# Patient Record
Sex: MALE | Race: BLACK OR AFRICAN AMERICAN | Employment: UNEMPLOYED | URBAN - METROPOLITAN AREA
[De-identification: names, ages, dates, MRNs, and addresses within clinical notes are randomized per-mention and may not be internally consistent; named-entity substitution may affect disease eponyms.]

---

## 2017-02-07 ENCOUNTER — HOSPITAL ENCOUNTER (OUTPATIENT)
Dept: WOUND CARE | Age: 77
Discharge: HOME | End: 2017-02-07
Payer: COMMERCIAL

## 2017-02-07 PROCEDURE — 99212 OFFICE O/P EST SF 10 MIN: CPT

## 2017-02-07 PROCEDURE — 11042 DBRDMT SUBQ TIS 1ST 20SQCM/<: CPT

## 2017-02-07 PROCEDURE — 11045 DBRDMT SUBQ TISS EACH ADDL: CPT

## 2017-02-07 RX ORDER — THERA TABS 400 MCG
1 TAB ORAL DAILY
COMMUNITY

## 2017-02-07 RX ORDER — LIDOCAINE HYDROCHLORIDE 20 MG/ML
JELLY TOPICAL
Status: COMPLETED | OUTPATIENT
Start: 2017-02-07 | End: 2017-02-07

## 2017-02-07 RX ORDER — DEXTROMETHORPHAN HYDROBROMIDE, GUAIFENESIN 5; 100 MG/5ML; MG/5ML
650 LIQUID ORAL
COMMUNITY
End: 2018-01-29

## 2017-02-07 RX ADMIN — LIDOCAINE HYDROCHLORIDE 4 ML: 20 JELLY TOPICAL at 11:00

## 2017-02-07 NOTE — PROGRESS NOTES
[address] [place] [facility] 12 [address] [facility]   WOUND CARE PROGRESS NOTE       Name:  Jacquelin Hare   MR#:  [id number]   :  1940   Account #:  [de-identified]        Date of Adm:  2017       CHIEF COMPLAINT: The patient has wounds to both lower legs. HISTORY OF PRESENT ILLNESS: The patient is being evaluated at   the wound care clinic for wounds to both lower extremities they   have had over the past few months. The history is obtained from the   records and the aide that accompanied him today. The patient has   dementia; therefore, he is not a reliable source. He appears to have no   pain associated with the wound. The nursing home has been doing   regular dressing changes with the application of a nonadherent   followed by a paste-impregnated wrap. He spends most of the day with   his legs in the dependent position. The ambulates at times, but only   briefly. Other than his wound, he has no history of having wounds of   his lower legs that were difficult to heal. He has no history of   claudication or circulation issues. 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 PAST SURGICAL HISTORY: Appendectomy, urinary stents. ALLERGIES: HE IS ALLERGIC TO PENICILLIN. CURRENT MEDICATIONS   1. Amantadine 100 mg.   2. Dulcolax 5 mg.   3. Sinemet 25/100.   4. Vitamin D3.   5. Pepcid. 6. Florinef 0.1 mg tablets. 7. Ritalin 5 mg. .   9. Potassium. 10. Seroquel 25 mg. SOCIAL HISTORY: He lives at the skilled nursing facility. He is not   , does not drink or smoke. FAMILY HISTORY: Could not be obtained and is not in his current   records. REVIEW OF SYSTEMS: He appears to have no headache. He has no   abdominal pain, no chest pain or shortness of breath.     PHYSICAL EXAMINATION   GENERAL: He is lying comfortably in the bed.   VITAL SIGNS: His temperature is 98.89. His respirations 16, pulse 76,   blood pressure 108/66. HEENT: His pupils are equal, round, and extraocular movements are   intact. Sclerae are white. NECK: Supple, full range of motion, no lymphadenopathy or   thyromegaly. LUNGS: Clear without rales or rhonchi. CARDIOVASCULAR: Regular rate and rhythm without murmur. ABDOMEN: Soft, flat, nontender. EXTREMITIES: Lower extremity has minimal swelling. He has   palpable dorsalis pedis pulse. He has ERIC on the left at 0.74, on the   right 1.19. He has brisk capillary refill. He has multiple wounds on both   lower legs. The right are measured as a cluster measures 25 cm in   length, 20 cm in width, and 0.1 cm in depth. There is some slough and   necrotic subcutaneous tissue. There is clear serous drainage. There is   some scaling skin. There is no odor. There is no tenderness. The left   lower leg again is measured as a cluster 23 cm length, 24.5 cm in   width, and 0.1 cm depth. There is some slough. There is some necrotic   tissue. There is a clear serous discharge. There is no surrounding   erythema. There is no odor. There is no tenderness. ASSESSMENT: Full-thickness wounds on both lower extremities   secondary to venous insufficiency. Treatment today consisted of   debridement of the subcutaneous tissue on both lower extremities. Consent was obtained through the brother. A time-out was taken. The   risks were reviewed. Lidocaine 2% topical gel was used as anesthetic   agent. A 5-0 curette was used to remove the excess necrotic tissue   and slough from both lower extremities. The patient tolerated the   procedure well. There was minimal bleeding, which was controlled with   pressure from gauze. Both nonadherent and adherent, both viable and   nonviable tissue was removed and discarded.  The post-debridement   measurement of the right lower leg was 25 cm length, 20 cm in width,   and 0.2 cm in depth and the left lower leg was 23 cm length, 24.5 cm   in width, and 0.2 cm in depth, not the entire area was debrided since it   measures as a cluster. The wounds were then cleaned with saline   and Aquacel AG was applied, followed by a 3-layer compression wrap. PLAN: The patient was recommended to have a dressing change with   a compression wrap mid week. he will then followup in 1 week. I   recommended that he spend his time with his legs elevated equal to   his heart. If he has any issues, he will  call or come back sooner. Otherwise, he will be reevaluated in 1 week.          MD JOEL Chery / Rae Farmer   D:  02/07/2017   10:30   T:  02/07/2017   11:57   Job #:  [id number]

## 2017-02-14 ENCOUNTER — HOSPITAL ENCOUNTER (OUTPATIENT)
Dept: WOUND CARE | Age: 77
Discharge: HOME | End: 2017-02-14
Payer: COMMERCIAL

## 2017-02-14 PROCEDURE — 29581 APPL MULTLAYER CMPRN SYS LEG: CPT

## 2017-02-14 RX ORDER — LIDOCAINE HYDROCHLORIDE 20 MG/ML
JELLY TOPICAL
Status: COMPLETED | OUTPATIENT
Start: 2017-02-14 | End: 2017-02-14

## 2017-02-14 RX ADMIN — LIDOCAINE HYDROCHLORIDE 4 ML: 20 JELLY TOPICAL at 09:48

## 2017-02-14 NOTE — PROGRESS NOTES
[address] [place] [facility] [address]   WOUND CARE PROGRESS NOTE       Name:  Merly Mendez   MR#:  [id number]   :  1940   Account #:  [de-identified]        Date of Adm:  2017       DATE OF SERVICE: 2017    CHIEF COMPLAINT: The patient has wounds in both lower legs. \"     HISTORY OF PRESENT ILLNESS: The patient is following up at the   wound care clinic for wounds on both lower extremities in the setting of   venous insufficiency. History is obtained from the records and the aide   that accompanied him today. The patient has been doing well, having   the compression wraps in place. He had a compression wrap changed   mid week. He appears to have no pain associated with the wounds. He   appears to have no nausea, vomiting, fevers or chills. He spends part   of the day with his legs in the dependent position and the other part   lying in his bed equal to his heart. PHYSICAL EXAMINATION   GENERAL: On exam, he is sitting comfortably in his wheelchair. VITAL SIGNS: His temperature is 98.0, respirations 16, pulse 71,   blood pressure is 115/70. EXTREMITIES: He has a wound in his right lower leg circumferentially   measured as a cluster 25.0 cm in length, 20.0 cm in width and 0.1 cm   in depth. There is no slough. There is pink, spongy granulation tissue. There is a clear serous discharge. There is no surrounding erythema. There is no odor. There is no tenderness. Wound #2 on the left lower   extremity measured as a cluster measures 23.0 cm in length, 24.5 cm   in width and 0.1 cm in depth. There is no slough. There is pink, spongy   granulation tissue. There is a clear serous discharge. There is no   surrounding erythema. There is no odor. There is no tenderness. ASSESSMENT: Full-thickness wound on both lower extremities in the   setting of venous insufficiency.  Treatment today consisted of cleaning   the wounds with saline and then applying a nonadherent followed by a   3-layer compression wrap. PLAN: The patient was encouraged to have his legs elevated equal to   his heart throughout the day. They are informed to try to avoid having   him sitting with his legs in the dependent position. I did not recommend   a compression wrap change mid week.          MD JOEL Oropeza / Fabby Mcintyre   D:  02/14/2017   10:08   T:  02/14/2017   10:42   Job #:  [id number]

## 2017-02-21 ENCOUNTER — APPOINTMENT (OUTPATIENT)
Dept: WOUND CARE | Age: 77
End: 2017-02-21
Payer: COMMERCIAL

## 2017-03-02 ENCOUNTER — HOSPITAL ENCOUNTER (OUTPATIENT)
Dept: WOUND CARE | Age: 77
Discharge: HOME | End: 2017-03-02
Payer: COMMERCIAL

## 2017-03-02 PROCEDURE — 29581 APPL MULTLAYER CMPRN SYS LEG: CPT

## 2017-03-02 RX ORDER — LIDOCAINE HYDROCHLORIDE 20 MG/ML
JELLY TOPICAL
Status: COMPLETED | OUTPATIENT
Start: 2017-03-02 | End: 2017-03-02

## 2017-03-02 RX ADMIN — LIDOCAINE HYDROCHLORIDE 2 ML: 20 JELLY TOPICAL at 14:38

## 2017-03-02 NOTE — PROGRESS NOTES
Wound Center  Progress Note    Subjective:   Corey Wrayite is a 65 y.o. male for follow up of circumferential ulcers of the bilateral legs  venous stasis ulcer. Pt is in long term care facility and has not been to the office for the past 2 weeks. He presented today in his 3 layer wraps, which were not removed last week and replaced with double tubigrips, as instructed. Offloading wound: somewhat, but not a conscious effort per the staff  Appetite: poor  Wound associated pain: none  Diabetic: no  Smoker: no  ROS: no N/V, no T/chills; no local rash  There have been no changes in patient's medical history in the interim. Objective:   T: 98.1 P: 66  RR: 16  BP: 128/84   General: well developed, well nourished, pleasant , NAD. Hygiene good  Psych: cooperative. Pleasant. Flat affect. Neuro: evidence of stroke, alert and oriented to person/place/situation. Otherwise nonfocal.  HEENT: Normocephalic, atraumatic. EOMI. Conjunctiva clear. No scleral icterus. Neck: Normal range of motion. No masses. Chest: Good chest expansion bilat  Lower extremities: evidence of ulcers circumferentially bilaterally- see below. Temperature normal. Hair growth is not present. Calves are supple, nontender, approximately equally sized in comparison. Capillary refill <3 sec  Focused Lower Extremity Exam:  Vascular exam:  Right lower extremity: mild  edema   DP pulse : 1+  PT pulse: 1+  Nails dystrophic     Left lower extremity: mild  edema   DP pulse : 1+  PT pulse: 1+   Nails dystrophic    Ulcer Description:   Etiology: venous stasis  Location: bilateral legs, circumferential  Measurement: R lower leg: 26 x 23 x 0.1 cm, L lower leg: 20 x 24 x 0.1 cm  Ulcer bed: Granular/Healthy    Periwound: Normal  Exudate: Scant/small amount Serosanguinous exudate    Assessment/Plan   65 y.o. male with circumferential bilateral venous stasis ulcers.  Did not come for his appt last week and 3-layer wraps were not removed and replaced with double tubigrips, as instructed. Ulcers have increased in size. Ulcers cleansed with saline. Dressing: Xeroform and three layer wraps  Frequency: weekly    Spent most of the 25 minute appointment discussed the importance of weekly appointments, increasing nutritional intake- specifically protein, and elevation of legs. Patient understood and agrees with plan. Questions answered. Weekly visits and serial debridements also discussed.   Follow up with in 1 week

## 2017-03-09 ENCOUNTER — HOSPITAL ENCOUNTER (OUTPATIENT)
Dept: WOUND CARE | Age: 77
Discharge: HOME | End: 2017-03-09
Payer: COMMERCIAL

## 2017-03-09 PROCEDURE — 99214 OFFICE O/P EST MOD 30 MIN: CPT

## 2017-03-10 NOTE — PROGRESS NOTES
[address] [place] [facility] [place] [address]   WOUND CARE PROGRESS NOTE       Name:  Maria Eugenia Khalil   MR#:  [id number]   :  1940   Account #:  [de-identified]        Date of Adm:  2017       DATE OF SERVICE: 2017    SUBJECTIVE: The patient is seen after missing an appointment or two   for followup of bilateral lower extremity venous stasis disease. He has   no current complaints in terms of discomfort of the lower extremities   and has tolerated his 3-layer wrap apparently without removing it this   time through (that is a bit unusual for him). PHYSICAL EXAMINATION   GENERAL: He is awake, alert and interactive. Apparently we have   been having some difficulties in terms of a mild dementia that has led   to some difficulty with compliance as noted above. VITAL SIGNS: Include a temperature of 98.2, pulse is 64 and regular   at this time, respiratory rate 16, blood pressure 130/82. EXTREMITIES: Examination of the lower extremities is a little puzzling. The areas overall are quite large on either leg and include mild   erythema of the skin and a splotchiness of involvement. The overall   picture includes the left leg with a 23 x 29 cm area of involvement and   the right with a 24 x 25 cm area of involvement. There is mottling of dry   dead skin with red thinly epithelialized areas, but only a rare area that   is open with granulation tissue and perhaps a little peripheral edema   perhaps 1+. ASSESSMENT: He has bilateral venous stasis disease and obviously   significantly fragile re-epithelialization over a large area. PLAN: The plan at this time is moisturization, coverage with Xeroform,   3-layer wraps and elevation whenever that is possible. He and his   caretaker know that he must get back in touch with us for erythema,   edema, additional pain, or increased warmth.  He will be back in about   a week so that we can see whether or not these areas look any more stable.          Osborne Mate, MD Claudene Divine / Dimitrios Mcelroy   D:  03/10/2017   09:09   T:  03/10/2017   09:48   Job #:  [id number]

## 2017-03-16 ENCOUNTER — APPOINTMENT (OUTPATIENT)
Dept: WOUND CARE | Age: 77
End: 2017-03-16
Payer: COMMERCIAL

## 2017-03-16 ENCOUNTER — HOSPITAL ENCOUNTER (OUTPATIENT)
Dept: WOUND CARE | Age: 77
Discharge: HOME | End: 2017-03-16
Payer: COMMERCIAL

## 2017-03-16 PROCEDURE — 74011000250 HC RX REV CODE- 250: Performed by: NURSE PRACTITIONER

## 2017-03-16 PROCEDURE — 97597 DBRDMT OPN WND 1ST 20 CM/<: CPT

## 2017-03-16 RX ORDER — LIDOCAINE HYDROCHLORIDE 20 MG/ML
JELLY TOPICAL
Status: COMPLETED | OUTPATIENT
Start: 2017-03-16 | End: 2017-03-16

## 2017-03-16 RX ADMIN — LIDOCAINE HYDROCHLORIDE 4 ML: 20 JELLY TOPICAL at 14:03

## 2017-03-16 NOTE — PROGRESS NOTES
Wound Center  Progress Note    Subjective:   Josie Altman is a 65 y.o. male for follow up of circumferential ulcers of the bilateral legs venous stasis ulcers. Pt is in long term care facility and has been following with our office for the past month or so. The  Facility would like him discharged at this time from our care as his facility now has wound care in house. Offloading wound: somewhat, but not a conscious effort per the staff  Appetite: poor, but better than two weeks ago when I saw him last (per pt)  Wound associated pain: none  Diabetic: no  Smoker: no  ROS: no N/V, no T/chills; no local rash  There have been no changes in patient's medical history in the interim. Objective:   T: 97.3 P: 65  RR: 16  BP: 127/81   General: well developed, well nourished, pleasant , NAD. Hygiene good  Psych: cooperative. Pleasant. Flat affect. Neuro: evidence of stroke, alert and oriented to person/place/situation. Few words spoken, but verbal on occasion. Otherwise nonfocal.  HEENT: Normocephalic, atraumatic. EOMI. Conjunctiva clear. No scleral icterus. Neck: Normal range of motion. No masses. Chest: Good chest expansion bilat  Lower extremities: evidence of ulcers circumferentially bilaterally- see below. Temperature normal. Hair growth is not present. Calves are supple, nontender, approximately equally sized in comparison. Capillary refill <3 sec  Focused Lower Extremity Exam:  Vascular exam:  Right lower extremity: mild  edema   DP pulse : 1+  PT pulse: 1+  Nails dystrophic     Left lower extremity: mild  edema   DP pulse : 1+  PT pulse: 1+   Nails dystrophic    Ulcer Description:   Etiology: venous stasis  Location: bilateral legs, circumferential  Measurement: R lower leg: 24 x 25 x 0.1 cm, L lower leg: 23 x 29 x 0.1 cm.  Ulcers appear to have a splotchiness appearance and have some evidence of epithelialization under loose skin  Ulcer bed: Granular/Healthy    Periwound: Normal  Exudate: Scant/small amount Serosanguinous exudate    Assessment/Plan   65 y.o. male with circumferential bilateral venous stasis ulcers. Ulcers cleansed with saline and forceps used to debride/remove the loose skin from the ulcers bilaterally. Dressing: Xeroform and double tubigrips    , Real , spoke with Consuelo Buerger from the facility and confirmed wound care services now available at the facility. They will be taking over evaluation and management of Mr. Elzbieta Limon. Discussed this with Mr. Elzbieta Limon who said he does not \"think much of the wound care services\" and indicated they have been rough with dressing changes in the past. We discussed giving the new services a chance for about a month, and that we will be contact with him to ensure he is satisfied with the wound care and healing. He appeared satisfied with this plan. Patient understood and agrees with plan. Questions answered. Pt discharged from our care at this time.

## 2018-01-29 ENCOUNTER — HOSPITAL ENCOUNTER (OUTPATIENT)
Dept: WOUND CARE | Age: 78
Discharge: HOME | End: 2018-01-29
Payer: MEDICARE

## 2018-01-29 PROCEDURE — 17250 CHEM CAUT OF GRANLTJ TISSUE: CPT

## 2018-01-29 PROCEDURE — 29581 APPL MULTLAYER CMPRN SYS LEG: CPT

## 2018-01-29 PROCEDURE — 99212 OFFICE O/P EST SF 10 MIN: CPT

## 2018-01-29 RX ORDER — MIRTAZAPINE 15 MG/1
15 TABLET, FILM COATED ORAL
COMMUNITY

## 2018-01-29 RX ORDER — OXYCODONE AND ACETAMINOPHEN 5; 325 MG/1; MG/1
1 TABLET ORAL
COMMUNITY

## 2018-01-29 RX ORDER — FLUOXETINE HYDROCHLORIDE 40 MG/1
40 CAPSULE ORAL DAILY
COMMUNITY

## 2018-01-29 RX ORDER — RANITIDINE 150 MG/1
150 TABLET, FILM COATED ORAL 2 TIMES DAILY
COMMUNITY

## 2018-01-29 RX ORDER — ACETAMINOPHEN 325 MG/1
650 TABLET ORAL
COMMUNITY

## 2018-01-29 NOTE — PROGRESS NOTES
[address] CARE PROGRESS NOTE    Chun Rowe  MR#: [id number]  : 1940  ACCOUNT #: [de-identified]   DATE OF SERVICE: 2018    HISTORY OF PRESENT ILLNESS:  This is a 23-year-old male who presents from a nursing home with bilateral lower extremity ulcerations. The patient was sent to the wound care center by Dr. Keshia Bryant for consultation. The patient is nonverbal.  All history was obtained from chart and the patient's aide. PAST MEDICAL HISTORY:  Positive for hypertension, stroke resulting in left-sided weakness, end-stage kidney disease, anemia, dementia, epilepsy. PAST SURGICAL HISTORY:  Positive for appendectomy and urinary stents. SOCIAL HISTORY:  The patient resides in a nursing home. MEDICATIONS:  Include Tylenol 325 mg as needed, Dulcolax 5 mg as needed, carbidopa/levodopa two tabs by mouth two times a day, Prozac 40 mg by mouth daily, Ritalin 10 mg by mouth daily, Remeron 15 mg by mouth at night, Percocet as needed for pain, potassium chloride 10 mEq by mouth daily, Zantac 150 mg by mouth two times a day and multivitamins. ALLERGIES:  INCLUDE PENICILLIN. REVIEW OF SYSTEMS:  Negative except for those mentioned in HPI. WOUND EXAMINATION:  Right lower extremity exam:  There is a circumferential venous leg wounds along the right lower extremity measuring roughly 28 x 22 x 0.1 cm. The wounds appear to be either granular or hypergranular in nature. There are no acute signs of infection. There is mild dependent edema in the right lower extremity with hyperpigmentation, consistent with venous insufficiency. DP and PT pulses are nonpalpable, but the PT pulse is obtainable via Doppler and is multiphasic. Left lower extremity exam:  There are circumferential venous leg wounds measuring roughly 27 x 24 x 0.1 cm that have a combination of granular and hypergranular bases. There are no acute signs of infection.   There is mild dependent edema in the left lower extremity with hyperpigmentation, consistent with venous insufficiency. DP and PT pulses are nonpalpable, but PT pulse is obtainable via Doppler and is multiphasic. ASSESSMENT:  1.  Bilateral venous leg ulcerations. 2.  Bilateral lower extremity edema. PLAN:  Clinically, the patient presents with significant bilateral venous leg ulcerations. On a positive note, the wounds appear to be either granular or hypergranular but do not appear to be infected. At today's visit, the hypergranular wounds were cauterized using silver nitrate, and then Aquacel Ag was applied to all wounds and 3-layer compression wraps were applied to both lower extremities. These are not to be changed. The patient will come back in 1 week for recheck.   He is to elevate when at rest.      AKBAR Nath  D: 01/29/2018 14:26     T: 01/29/2018 15:19  JOB #: [id number]  CC: Sasha Mendez MD

## 2018-02-05 ENCOUNTER — HOSPITAL ENCOUNTER (OUTPATIENT)
Dept: WOUND CARE | Age: 78
Discharge: HOME | End: 2018-02-05
Payer: MEDICARE

## 2018-02-05 PROCEDURE — 29581 APPL MULTLAYER CMPRN SYS LEG: CPT

## 2018-02-12 ENCOUNTER — HOSPITAL ENCOUNTER (OUTPATIENT)
Dept: WOUND CARE | Age: 78
Discharge: HOME | End: 2018-02-12
Payer: MEDICARE

## 2018-02-12 PROCEDURE — 29581 APPL MULTLAYER CMPRN SYS LEG: CPT

## 2018-02-12 NOTE — WOUND CARE
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 - Wound appear have gotten worse since last week due to the collagen dressing adhering to the skin  - Dressed with triamcenelone, non adherent DSD  - Continue compression to B/L LE in 3 laye wraps. - F/U 1 week. Subjective:  Patient seen for follow up to left B/l LE ulcerations. States he was able to tolerated dressing to both legs. Negative for fever, chills, nausea, vomiting, chest pain, shortness of breath. History:  Lower Extremity Wound  Allergies   Allergen Reactions    Penicillins Other (comments)     Fainting     Family History   Problem Relation Age of Onset    Heart Disease Mother     Cancer Father     Diabetes Father       Past Medical History:   Diagnosis Date    GERD (gastroesophageal reflux disease)     Other ill-defined conditions(799.89)     parkinson's disease    Other ill-defined conditions(799.89)     lower extremity swelling    Seizures ([address]     last seizure 2010    Stroke [facility])     intermittent left side weakness     Past Surgical History:   Procedure Laterality Date    HX APPENDECTOMY      HX UROLOGICAL      stent     Social History   Substance Use Topics    Smoking status: Never Smoker    Smokeless tobacco: Not on file    Alcohol use No       History   Alcohol Use No     History   Drug Use No      History   Smoking Status    Never Smoker   Smokeless Tobacco    Not on file     Current Outpatient Prescriptions   Medication Sig    FLUoxetine (PROZAC) 40 mg capsule Take 40 mg by mouth daily.  mirtazapine (REMERON) 15 mg tablet Take 15 mg by mouth nightly.  raNITIdine (ZANTAC) 150 mg tablet Take 150 mg by mouth two (2) times a day.  acetaminophen (TYLENOL) 325 mg tablet Take 650 mg by mouth every four (4) hours as needed for Pain.     oxyCODONE-acetaminophen (PERCOCET) 5-325 mg per tablet Take 1 Tab by mouth every four (4) hours as needed for Pain.  therapeutic multivitamin (THERA) tablet Take 1 Tab by mouth daily.  carbidopa-levodopa (SINEMET)  mg per tablet Take 2 Tabs by mouth two (2) times a day.  methylphenidate (RITALIN) 5 mg tablet Take 10 mg by mouth daily.  bisacodyl (DULCOLAX) 5 mg EC tablet Take 5 mg by mouth daily as needed for Constipation.  potassium chloride SA (MICRO-K) 10 mEq capsule Take 10 mEq by mouth daily. No current facility-administered medications for this encounter. Objective:  Vitals: 97.8 53 16 103/70    Vascular:  B/L LE  DP 1/4; PT1/4  capillary fill time brisk, pitting edema is present, skin temperature is cool, varicosities are absent    Dermatological:    Wound:   Location: left leg ulceration    Measurements: 17n34i2.1cm  Margins: intact  Drainage: serous  Odor: none  Wound base: granular  Lymphangitic streaking? No.  Undermining? No.  Sinus tracts? No.  Exposed bone? No.  Subcutaneous crepitation on palpation? No.    Location: right leg ulceration    Measurements: 26u74i1.1cm  Margins: intact  Drainage: serous  Odor: none  Wound base: granular  Lymphangitic streaking? No.  Undermining? No.  Sinus tracts? No.  Exposed bone? No.  Subcutaneous crepitation on palpation? No.      Nails are thickened, elongated, discolored, painful to palpation, 3 mm thick, with subungual debris. Skin is dry and scaly     There is no maceration of the interspaces of the feet b/l. Neurological:  DTR are present, protective sensation per 5.07 Martelle Lanette monofilament is lost, patient is AAOx3, mood is normal. Epicritic sensation is intact. Orthopedic:  B/L LE are symmetric, ROM of ankle, STJ, 1st MTPJ is limited, MMT - unable to assess. There has been no amputations. Constitutional: Pt is a well developed, elderly thin male.     Lymphatics: negative tenderness to palpation of neck/axillary/inguinal nodes. [facility]  Héctor Marks, AKBAR Vides, [address] [facility], [address] [place] [address]. [address], [place], [address] [facility]  P: [phone]  F: [phone]

## 2018-02-19 ENCOUNTER — HOSPITAL ENCOUNTER (OUTPATIENT)
Dept: WOUND CARE | Age: 78
Discharge: HOME | End: 2018-02-19
Payer: MEDICARE

## 2018-02-19 PROCEDURE — 29581 APPL MULTLAYER CMPRN SYS LEG: CPT

## 2018-02-19 NOTE — PROGRESS NOTES
[facility] [address] CARE PROGRESS NOTE    Nancy Kaur  MR#: [id number]  : 1940  ACCOUNT #: [de-identified]   DATE OF SERVICE: 2018    Patient presents today from a nursing home for followup to bilateral venous leg ulcerations. The patient is nonverbal.  He is accompanied by his aide. Vital signs stable. The patient is afebrile. Right lower extremity exam:  There are circumferential granular and hypergranular wounds measuring 37 x 25 x 0.1 cm around the entire right lower extremity. The wounds are not acutely infected. They all appear to be granular or hypergranular in nature. The DP and PT pulses are nonpalpable. The PT pulse is obtainable via Doppler. Left lower extremity exam:  There are circumferential wounds all over the left lower extremity measuring 26 x 29 x 0.1 cm that have granular or hypergranular bases. There are no acute signs of infection. DP and PT pulses are nonpalpable, but PT pulse is obtainable via Doppler. ASSESSMENT:  1.  Bilateral venous leg ulcerations. 2.  Bilateral lower extremity edema. PLAN:  Clinically, the patient's bilateral lower extremity venous wounds are stable. The Aquacel AG that was used prior seemed to have aggravated the issue instead of helping it. We will go back to using Xeroform and 3-layer compression wraps of both lower legs. The patient will follow up in one week.       AKBAR Burns / Luzma Thompson  D: 2018 14:43     T: 2018 15:25  JOB #: [id number]

## 2018-02-26 ENCOUNTER — HOSPITAL ENCOUNTER (OUTPATIENT)
Dept: WOUND CARE | Age: 78
Discharge: HOME | End: 2018-02-26
Payer: MEDICARE

## 2018-02-26 PROCEDURE — 29581 APPL MULTLAYER CMPRN SYS LEG: CPT

## 2018-02-26 NOTE — PROGRESS NOTES
[facility] 1737 CARE PROGRESS NOTE    Titus Gomes.  MR#: [id number]  : 1940  ACCOUNT #: [de-identified]   DATE OF SERVICE: 2018    TREATING PHYSICIAN:  Anamaria Romero DPM    HISTORY OF PRESENT ILLNESS:  The patient presents today for followup to bilateral venous leg ulcerations. Denies any nausea, vomiting, fevers, night sweats or chills. He is accompanied by his aide. PHYSICAL EXAMINATION:  VITAL SIGNS:  Stable. Patient afebrile. RIGHT LOWER EXTREMITY:  There is a circumferential area of granular to hypergranular wounds measuring 36 x 28 x 0.1 cm. The wounds are partial thickness in nature with no acute signs of infection. There is hyperpigmentation consistent with venous insufficiency. LEFT LOWER EXTREMITY:  There is a circumferential area of granular and hypergranular wounds measuring 25 x 29 x 0.1 cm that are all partial thickness in nature, with no acute signs of infection. There is hyperpigmentation consistent with venous insufficiency. ASSESSMENT:  1.  Bilateral venous leg ulcerations. 2.  Bilateral lower extremity edema. PLAN:  1. The patient's wounds appear to be stable and healing gradually. We will continue Xeroform and 3-layer compression wraps on both legs. 2.  The patient to follow up in 1 week.       AKBAR Tarango  D: 2018 13:56     T: 2018 14:18  JOB #: [id number]

## 2018-03-19 ENCOUNTER — HOSPITAL ENCOUNTER (OUTPATIENT)
Dept: WOUND CARE | Age: 78
Discharge: HOME | End: 2018-03-19
Payer: MEDICARE

## 2018-03-19 PROCEDURE — 99213 OFFICE O/P EST LOW 20 MIN: CPT

## 2018-03-19 NOTE — PROGRESS NOTES
[facility] CARE PROGRESS NOTE    Wes Richards.  MR#: [id number]  : 1940  ACCOUNT #: [de-identified]   DATE OF SERVICE: 2018    SUBJECTIVE:  The patient presents today from a nursing home for followup to bilateral venous leg ulcerations. The patient is nonverbal.    OBJECTIVE:    VITAL SIGNS:  Stable. The patient is afebrile. RIGHT LOWER EXTREMITY:  There is circumferential area of venous leg wounds measuring 36 x 28 x 0.1 cm ranging from granular to a hypergranular base and no acute signs of infection. There is very mild dependent edema in the right lower extremity. Left lower extremity exam, there is a circumferential area of venous leg wounds measuring 27 x 29 x 0.1 cm ranging from a granular to hypergranular base. No acute signs of infection. There is very minimal dependent edema in the left lower extremity. IMPRESSION:  1. Bilateral venous leg ulcerations. 2.  Bilateral lower extremity edema. PLAN:  Due to the fact that the patient is bedbound and is currently in a nursing home, we will avoid using three layer wraps. The orders will be put in for daily wound care changes consisting of Xeroform gauze, roll gauze, and Prevalon boots in order to offload both feet to prevent from pressure sores. Patient to follow up in one week for a recheck.       AKBAR Delgado RN  D: 2018 15:08     T: 2018 15:41  JOB #: [id number]

## 2018-03-26 ENCOUNTER — HOSPITAL ENCOUNTER (OUTPATIENT)
Dept: WOUND CARE | Age: 78
Discharge: HOME | End: 2018-03-26
Payer: MEDICARE

## 2018-03-26 PROCEDURE — 99214 OFFICE O/P EST MOD 30 MIN: CPT

## 2018-03-26 NOTE — PROGRESS NOTES
[facility] CARE PROGRESS NOTE    Matt Hopkins.  MR#: [id number]  : 1940  ACCOUNT #: [de-identified]   DATE OF SERVICE: 2018    TREATING PHYSICIAN:  Jorge Rivas DPM.    SUBJECTIVE:  Patient presents today from his nursing home for followup to bilateral venous leg ulcerations. Denies any nausea, fevers or chills. He is accompanied by his aide. OBJECTIVE:  Vital signs are stable, the patient is afebrile. Bilateral lower extremity exam, there are circumferential wounds that are hypergranular/granular in nature throughout the anterior, medial, lateral and posterior aspects of each leg. The wounds do not appear to be acutely infected. The periwound appearance is healthy and appears to be less irritated and macerated compared to last week. There is hypopigmentation in the legs consistent with venous insufficiency. There is minimal dependent edema in the bilateral lower extremities. ASSESSMENT  1. Bilateral venous leg ulcerations. 2.  Bilateral lower extremity edema. PLAN  1. Clinically, the wounds look much better than they did last week. We will continue daily wound care consisting of Xeroform, dry sterile gauze, which is to be applied every day. The patient is to elevate when at rest.  2.  Patient to follow up in 1 week.       AKBAR Young / Stacia Siu  D: 2018 13:21     T: 2018 13:39  JOB #: [id number]

## 2018-04-02 ENCOUNTER — HOSPITAL ENCOUNTER (OUTPATIENT)
Dept: WOUND CARE | Age: 78
Discharge: HOME | End: 2018-04-02
Payer: MEDICARE

## 2018-04-02 PROCEDURE — 17250 CHEM CAUT OF GRANLTJ TISSUE: CPT

## 2018-04-02 NOTE — PROGRESS NOTES
[facility] CARE PROGRESS NOTE    Lazarus Corona  MR#: [id number]  : 1940  ACCOUNT #: [de-identified]   DATE OF SERVICE: 2018    TREATING PHYSICIAN:  Diana Stovall DPM.     The patient presents today with his aide for followup to a bilateral venous leg ulcerations. Patient denies any nausea, vomiting, fevers, night sweats or chills. Vital signs are stable. The patient is afebrile. RIGHT LOWER EXTREMITY EXAM:  There is a circumferential area along the entire aspect of the right lower extremity measuring 28 x 23 x 0.1 cm consisting of wounds that are either granular or hypergranular in nature. There are no acute signs of infection. There is more epithelialization noted in certain areas compared to last time. Vascular status is intact and unchanged. LEFT LOWER EXTREMITY EXAM:  There is a circumferential area along the entire left lower leg measuring 23 x 17 x 0.1 cm consisting of hypergranular and granular wounds. There are no acute signs of infection. There is increase in epithelization compared to last time. Neurovascular status is unchanged. IMPRESSION:  1. Bilateral venous leg ulcerations. 2.  Bilateral lower extremity edema (minimal). PLAN:  I had a long discussion with the patient this afternoon. I informed him that his wounds appeared to be responding well to the daily dressing changes; however, some of his wounds are hypergranular in nature. At today's visit, silver nitrate was used on both lower extremities to cauterize the hypergranular wounds in question. We will continue Xeroform and a dry sterile dressings on both legs, which will be changed every day. Patient to follow up with me in 1 week.       AKBAR Moss  D: 2018 13:17     T: 2018 13:58  JOB #: [id number]

## 2018-05-22 ENCOUNTER — HOSPITAL ENCOUNTER (OUTPATIENT)
Dept: WOUND CARE | Age: 78
Discharge: HOME | End: 2018-05-22
Payer: MEDICARE

## 2018-05-22 VITALS
SYSTOLIC BLOOD PRESSURE: 115 MMHG | HEART RATE: 65 BPM | TEMPERATURE: 97.5 F | RESPIRATION RATE: 16 BRPM | DIASTOLIC BLOOD PRESSURE: 74 MMHG

## 2018-05-22 PROBLEM — L97.919 CHRONIC VENOUS HYPERTENSION (IDIOPATHIC) WITH ULCER AND INFLAMMATION OF BILATERAL LOWER EXTREMITY (HCC): Status: ACTIVE | Noted: 2018-05-22

## 2018-05-22 PROBLEM — I87.333 CHRONIC VENOUS HYPERTENSION (IDIOPATHIC) WITH ULCER AND INFLAMMATION OF BILATERAL LOWER EXTREMITY (HCC): Status: ACTIVE | Noted: 2018-05-22

## 2018-05-22 PROBLEM — L97.929 CHRONIC VENOUS HYPERTENSION (IDIOPATHIC) WITH ULCER AND INFLAMMATION OF BILATERAL LOWER EXTREMITY (HCC): Status: ACTIVE | Noted: 2018-05-22

## 2018-05-22 PROCEDURE — 99214 OFFICE O/P EST MOD 30 MIN: CPT

## 2018-05-22 NOTE — WOUND CARE
Visit Vitals    /74    Pulse 65    Temp 97.5 °F (36.4 °C)    Resp 16        05/22/18 1020   Wound Leg Lower Left   Date First Assessed/Time First Assessed: 05/22/18 1019   POA: Yes  Wound Type: Venous  Location: Leg Lower  Orientation: Left   DRESSING STATUS Clean, dry, and intact; Removed   DRESSING TYPE Non-adherent;Gauze wrap (rakel)   Wound Length (cm) 27 cm   Wound Width (cm) 12 cm   Wound Depth (cm) 0.1   Wound Surface area (cm^2) 324 cm^2   Condition of Base Hypergranulation   Tissue Type Percent Red 100   Drainage Amount  Small    Wound Leg Lower Right   Date First Assessed/Time First Assessed: 05/22/18 1019   POA: Yes  Wound Type: Venous  Location: Leg Lower  Orientation: Right   DRESSING STATUS Clean, dry, and intact; Removed   DRESSING TYPE Non-adherent;Gauze wrap (rakel)   Wound Length (cm) 32 cm   Wound Width (cm) 11 cm   Wound Depth (cm) 0.1   Wound Surface area (cm^2) 352 cm^2   Condition of Base Hypergranulation   Tissue Type Percent Red 100   Drainage Amount  Small

## 2018-05-22 NOTE — WOUND CARE
Discharge Condition: Stable  Ambulatory Status: Stretcher  Discharge Destination: [place]  Transportation: Private Ambulance/Transportation  Accompanied by: Bethanie Rodríguez

## 2018-05-22 NOTE — PROGRESS NOTES
I have noted, and reviewed today's data for this patient in [facility], and concur with same. The focused physical exam, past history, review of symptoms and medications remains unchanged today except as noted below. Patient Report: no response from patient, care giver reports notes no real change, . Lesion/Wound, focused exam on Presentation today: BLEs with hypertrophic granulation in numerous ulcerations all bleeding easily with dry scaling skin between, . Clinical Considerations: as above, venous insufficiency but arterial adequacy to BLEs , .  Counseled regarding/Discussed: as above, need better time under dressing to avoid denuding the new squamous tissue, will try adaptic against the skin, Xeroform over this and change every other day, will try to change just down to the adaptic if possible. .  Follow up and future plans: return to usual doctor 1 week, aim for longer duration of dressing in place to encourage squamous advance. Radha Narayan

## 2018-06-12 ENCOUNTER — HOSPITAL ENCOUNTER (OUTPATIENT)
Dept: WOUND CARE | Age: 78
Discharge: HOME | End: 2018-06-12
Payer: MEDICARE

## 2018-06-12 VITALS
SYSTOLIC BLOOD PRESSURE: 154 MMHG | TEMPERATURE: 99.8 F | HEART RATE: 65 BPM | DIASTOLIC BLOOD PRESSURE: 78 MMHG | RESPIRATION RATE: 16 BRPM

## 2018-06-12 PROBLEM — L97.913: Status: ACTIVE | Noted: 2018-06-12

## 2018-06-12 PROCEDURE — 97597 DBRDMT OPN WND 1ST 20 CM/<: CPT

## 2018-06-12 PROCEDURE — 97598 DBRDMT OPN WND ADDL 20CM/<: CPT

## 2018-06-12 PROCEDURE — 11043 DBRDMT MUSC&/FSCA 1ST 20/<: CPT

## 2018-06-12 NOTE — PROGRESS NOTES
I have noted, and reviewed today's data for this patient in [facility] and concur with same. The focused physical exam, other physical findings, Medical history, Review of Symptoms and Medications today remains unchanged except as noted below. Patient notes today: NH, nothing new. .  Lesion/Wound, focused exam on Presentation today: BLEs diffuse ulceration with watery weeping, macerated skin surround and a deeper ulcer R posterior calf with necrotic slough and non viable tendon tissue. .    Procedure:     Wound # BLEs, stasis ulceration with new deep ulcer into R call muscle. Procedure name: sharp debridement. Anaesthesia: Lidocaine; topical    Description: using a sharp curette and scissors I removed necrotic debris, slough and exudate in the RLE calf ulcer,  and using gauze I also selectively removed macerated skin ane crust where this covers open ulcers, effecting at last a clean bleeding base. Post debridement dimensions changed as noted:    Depth, add 3.0 mm;    Width, add 0.0 mm   Length, add 0.0 mm. Blood Loss: 3 CCs  Post Procedure Condition/ Diagnosis: as above, slow progress but wounds reasonably clean. Follow up and Future plans today: return 2 weeks, aquacel ag clarita 1-2 days to  R calf, continue adaptic, xeroform to other ulcers both legs. Rico Forrest Specimens: 0. Patient Counseled regarding/Discussed: as above, patient appears malnourished and perhaps anemic.  Will get nutrition review, labs,  Clinical Considerations: as above, watch for further breakdown

## 2018-06-19 ENCOUNTER — HOSPITAL ENCOUNTER (OUTPATIENT)
Dept: WOUND CARE | Age: 78
Discharge: HOME | End: 2018-06-19
Payer: MEDICARE

## 2018-06-19 VITALS
TEMPERATURE: 98.3 F | RESPIRATION RATE: 18 BRPM | HEART RATE: 81 BPM | SYSTOLIC BLOOD PRESSURE: 165 MMHG | DIASTOLIC BLOOD PRESSURE: 82 MMHG

## 2018-06-19 PROCEDURE — 11044 DBRDMT BONE 1ST 20 SQ CM/<: CPT

## 2018-06-19 PROCEDURE — 74011000250 HC RX REV CODE- 250: Performed by: EMERGENCY MEDICINE

## 2018-06-19 PROCEDURE — 97597 DBRDMT OPN WND 1ST 20 CM/<: CPT

## 2018-06-19 PROCEDURE — 11043 DBRDMT MUSC&/FSCA 1ST 20/<: CPT

## 2018-06-19 RX ORDER — LIDOCAINE HYDROCHLORIDE 20 MG/ML
JELLY TOPICAL
Status: COMPLETED | OUTPATIENT
Start: 2018-06-19 | End: 2018-06-19

## 2018-06-19 RX ADMIN — LIDOCAINE HYDROCHLORIDE 5 ML: 20 JELLY TOPICAL at 13:10

## 2018-06-19 NOTE — WOUND CARE
06/19/18 1310   Wound Leg Left   Date First Assessed/Time First Assessed: 06/12/18 1348   Wound Type: Vascular  Location: Leg  Orientation: Left   DRESSING STATUS Removed   DRESSING TYPE Non-adherent;Gauze wrap (rakel)   Non-Pressure Injury Partial thickness (epider/derm)   Wound Length (cm) 28.5 cm   Wound Width (cm) 13.5 cm   Wound Depth (cm) 0.1   Wound Surface area (cm^2) 384.75 cm^2   Tissue Type Red;Yellow   Drainage Amount  Large   Drainage Color Sanguinous   Wound Odor None   Periwound Skin Condition Erythema, blanchable   Cleansing and Cleansing Agents  Dermal wound cleanser   Wound Leg Right   Date First Assessed/Time First Assessed: 06/12/18 1357   Wound Type: Vascular  Location: Leg  Orientation: Right   DRESSING STATUS Removed   DRESSING TYPE Non-adherent;Gauze wrap (rakel)   Non-Pressure Injury Full thickness (subcut/muscle)   Wound Length (cm) 35 cm   Wound Width (cm) 13 cm   Wound Depth (cm) 0.8   Wound Surface area (cm^2) 455 cm^2   Condition of Base Slough;Pink;Granulation   Drainage Amount  Large   Drainage Color Sanguinous   Wound Odor None   Periwound Skin Condition Erythema, blanchable   Cleansing and Cleansing Agents  Dermal wound cleanser

## 2018-06-19 NOTE — PROGRESS NOTES
I have noted, and reviewed today's data for this patient in [facility] and concur with same. The focused physical exam, other physical findings, Medical history, Review of Symptoms and Medications today remains unchanged except as noted below. Patient notes today: no new problems, doing dressings every other day. Lesion/Wound, focused exam on Presentation today: BLEs diffuse multiple ulcers with marked hypertrophic granulation in most areas heaped above skin level by 2-4mm. Deep ulceration R posterior calf into centrolateral fibular bone tissue, smaller ulceration over lateral mid calf into sub Q. These ulcer have some slough and necrotic eschar, other ulcer areas without hypertrophic change have epidermal slough over the surface. .    Procedure:     Wound # BLEs, stasis ulcerations. Procedure name: sharp debridement, AgNo3 cautery to hypertrophic granulation, selective debridement to part thick slough areas. .  Anaesthesia: Lidocaine; topical    Description: using dry cotton gauze I removed epi and dermal slough wherever noted, using a sharp curette I removed necrotic slough and debris from ulcer tissue as deep as lamellar bone (R fibula) and sub Q (left post calf). Then using simple 'stick' applicators of AgNo3 I cauterized the surface of areas of hypertrophic granulation wherever encountered. Jude Piña Post debridement dimensions changed as noted:    Depth, add 1.0 mm;    Width, add 0.0 mm   Length, add 0.0 mm. Blood Loss: 4 CCs  Post Procedure Condition/ Diagnosis: as above, stasis dermatitis, ichemic/and hemorrhagic changes,. Follow up and Future plans today: return 1 week, continue aquacel to deeper ulcers, adaptic and non adherent dressing to other ulcer beds. Jude Piña Specimens: 0. Patient Counseled regarding/Discussed: as above, needs protein nutrition skin moisture control, pressure avoided and edema control.   Clinical Considerations: as all above, will be tricky to maintain edema control without pressure over bony prominences. Gae Notch

## 2018-06-26 ENCOUNTER — HOSPITAL ENCOUNTER (OUTPATIENT)
Dept: WOUND CARE | Age: 78
Discharge: HOME | End: 2018-06-26
Payer: MEDICARE

## 2018-06-26 VITALS
HEART RATE: 62 BPM | DIASTOLIC BLOOD PRESSURE: 76 MMHG | RESPIRATION RATE: 16 BRPM | TEMPERATURE: 97.8 F | SYSTOLIC BLOOD PRESSURE: 150 MMHG

## 2018-06-26 PROCEDURE — 11043 DBRDMT MUSC&/FSCA 1ST 20/<: CPT

## 2018-06-26 PROCEDURE — 74011000250 HC RX REV CODE- 250: Performed by: EMERGENCY MEDICINE

## 2018-06-26 RX ORDER — LIDOCAINE HYDROCHLORIDE 20 MG/ML
JELLY TOPICAL
Status: COMPLETED | OUTPATIENT
Start: 2018-06-26 | End: 2018-06-26

## 2018-06-26 RX ADMIN — LIDOCAINE HYDROCHLORIDE 5 ML: 20 JELLY TOPICAL at 15:37

## 2018-06-26 NOTE — PROGRESS NOTES
I have noted, and reviewed today's data for this patient in [facility] and concur with same. The focused physical exam, other physical findings, Medical history, Review of Symptoms and Medications today remains unchanged except as noted below. Patient notes today: smiles and tries to speak upon my exam today. NH staff says there is little change but wounds not bleeding as much. Lesion/Wound, focused exam on Presentation today: BLEs, very thin with ulcer to bone surface R posterior calf, multiple scattered skin ulcers with thick granulation tissue in all , some dermal slough, minimal drainage. .    Procedure:     Wound # BLEs pressure ulcers. .  Procedure name: sharp debridement to RLE including fibular surface. .  Anaesthesia: Lidocaine; topical    Description: using a sharp curette I removed necrotic tendon debris through periosteum over posterior fibular shaft to effect a clean bleeding base. Post debridement dimensions changed as noted:    Depth, add 3.0 mm;    Width, add 0.0 mm   Length, add 0.0 mm. Blood Loss: 3 CCs  Post Procedure Condition/ Diagnosis: hypertrophic granulation is improved, still significant evidence of pressure effects, potential new ulcer opening over L posterior fibular shaft area. Follow up and Future plans today: unloading these legs, continue aquacel to ulcers, xeroform to areas with more skin denudation. Specimens: 0. Patient Counseled regarding/Discussed: as above, will consider with NH obtaining a floatation mattress, other helps to unload these legs. .  Clinical Considerations: concern for nutrition, protein losses, pressure effects. Minor Ronde

## 2018-06-26 NOTE — WOUND CARE
06/26/18 1537   Wound Leg Left   Date First Assessed/Time First Assessed: 06/12/18 1348   Wound Type: Vascular  Location: Leg  Orientation: Left   DRESSING STATUS Removed   DRESSING TYPE ABD pad;4 x 4;Gauze wrap (rakel); Aquacel;Xeroform   Non-Pressure Injury Full thickness (subcut/muscle)   Wound Length (cm) 28 cm   Wound Width (cm) 14.5 cm   Wound Depth (cm) 0.1   Wound Surface area (cm^2) 406 cm^2   Tissue Type Red;Yellow   Drainage Amount  Moderate   Drainage Color Sanguinous   Wound Odor None   Periwound Skin Condition Intact   Cleansing and Cleansing Agents  Dermal wound cleanser   Wound Leg Right   Date First Assessed/Time First Assessed: 06/12/18 1357   Wound Type: Vascular  Location: Leg  Orientation: Right   DRESSING STATUS Removed   DRESSING TYPE ABD pad;Aquacel;Gauze wrap (rakel); Xeroform   Non-Pressure Injury Full thickness (subcut/muscle)   Wound Length (cm) 35.5 cm   Wound Width (cm) 13.5 cm   Wound Depth (cm) 0.9   Wound Surface area (cm^2) 479.25 cm^2   Condition of Base Slough;[place]   Drainage Amount  Moderate   Drainage Color Sanguinous   Wound Odor None   Periwound Skin Condition Erythema, blanchable   Cleansing and Cleansing Agents  Dermal wound cleanser

## 2018-07-03 ENCOUNTER — HOSPITAL ENCOUNTER (OUTPATIENT)
Dept: WOUND CARE | Age: 78
Discharge: HOME | End: 2018-07-03
Payer: MEDICARE

## 2018-07-03 VITALS
DIASTOLIC BLOOD PRESSURE: 80 MMHG | RESPIRATION RATE: 16 BRPM | HEART RATE: 68 BPM | TEMPERATURE: 98.5 F | SYSTOLIC BLOOD PRESSURE: 155 MMHG

## 2018-07-03 PROCEDURE — 11042 DBRDMT SUBQ TIS 1ST 20SQCM/<: CPT

## 2018-07-03 PROCEDURE — 97597 DBRDMT OPN WND 1ST 20 CM/<: CPT

## 2018-07-03 PROCEDURE — 11043 DBRDMT MUSC&/FSCA 1ST 20/<: CPT

## 2018-07-03 NOTE — WOUND CARE
07/03/18 1539   Wound Leg Left   Date First Assessed/Time First Assessed: 06/12/18 1348   Wound Type: Vascular  Location: Leg  Orientation: Left   DRESSING STATUS Clean, dry, and intact   DRESSING TYPE 4 x 4;ABD pad;Gauze;Gauze wrap (rakel); Xeroform   Wound Length (cm) 28 cm   Wound Width (cm) 14 cm   Wound Depth (cm) 0.1   Wound Surface area (cm^2) 392 cm^2   Tissue Type Red;Yellow   Drainage Amount  Large   Drainage Color Sanguinous   Wound Odor None   Periwound Skin Condition Intact   Cleansing and Cleansing Agents  Dermal wound cleanser   Wound Leg Right   Date First Assessed/Time First Assessed: 06/12/18 1357   Wound Type: Vascular  Location: Leg  Orientation: Right   DRESSING STATUS Clean, dry, and intact   DRESSING TYPE ABD pad;Gauze;Gauze wrap (rakel); Xeroform   Wound Length (cm) 34.5 cm   Wound Width (cm) 13.5 cm   Wound Depth (cm) 1   Wound Surface area (cm^2) 465.75 cm^2   Condition of Base Pink;Slough   Drainage Amount  Large   Drainage Color Sanguinous   Wound Odor None

## 2018-07-03 NOTE — PROGRESS NOTES
I have noted, and reviewed today's data for this patient in [facility] and concur with same. The focused physical exam, other physical findings, Medical history, Review of Symptoms and Medications today remains unchanged except as noted below. Patient notes today: talked with POA today who wants to consider Hospice but feels that family would not want to.patient has no changes. .  Lesion/Wound, focused exam on Presentation today: BLEs, diffuse, scattered ulceration with exuberant granulation, some bleeding. RLE mid posterior calf ulcer to exposed fibula, necrotic tendon tissue proximally, some exudate over surface of long ulcer, . Procedure:     Wound # BLEs, pressure ulcerations. To include R fibula. Procedure name: sharp debridement ( to bone and tendon) superficial/selective debridement (multiple ulcers BLEs), AgNO3 cautery to hypertrophic granulation . Anaesthesia: Lidocaine; topical    Description: using sharp scissor I cut away necrotic tendon tissue and periosteum of fibula to leave ulcer bed clean of eschar, I used dry cotton guaze to remove sperficial scale and crusting over exposed ulcers where noted and AgN03 on all exuberant granulation, sharply excising some in R enriqueta lateral ankle where it is overriding skin surround. .  Post debridement dimensions changed as noted:    Depth, add 1.0 mm;    Width, add 0.0 mm   Length, add 0.0 mm. Blood Loss: 5 CCs  Post Procedure Condition/ Diagnosis: as above, there is some improved skin coverage as well noted  This visit. .  Follow up and Future plans today: plan 2 week visit, dressings with xeroform over adaptic to most, aquacel to deeper ulceration changed every 4th day. Ghanshyam Tee Specimens: 0. Patient Counseled regarding/Discussed: will leave wounds alone an extra day at each dressing to give epithelium more time to adhere. .  Clinical Considerations: watch for infection, encourage nutrition, consider hopsice.

## 2018-07-16 ENCOUNTER — TELEPHONE (OUTPATIENT)
Dept: WOUND CARE | Age: 78
End: 2018-07-16

## 2018-07-16 NOTE — TELEPHONE ENCOUNTER
RN returned call to Vineet Jimenez from [facility]. [facility] not available  times 2 calls. No VM available.

## 2018-07-17 ENCOUNTER — HOSPITAL ENCOUNTER (OUTPATIENT)
Dept: WOUND CARE | Age: 78
Discharge: HOME | End: 2018-07-17
Payer: MEDICARE

## 2018-07-17 VITALS
TEMPERATURE: 98.3 F | HEART RATE: 70 BPM | DIASTOLIC BLOOD PRESSURE: 85 MMHG | RESPIRATION RATE: 16 BRPM | SYSTOLIC BLOOD PRESSURE: 154 MMHG

## 2018-07-17 PROCEDURE — 74011000250 HC RX REV CODE- 250: Performed by: EMERGENCY MEDICINE

## 2018-07-17 PROCEDURE — 11043 DBRDMT MUSC&/FSCA 1ST 20/<: CPT

## 2018-07-17 RX ORDER — LIDOCAINE HYDROCHLORIDE 40 MG/ML
SOLUTION TOPICAL
Status: COMPLETED | OUTPATIENT
Start: 2018-07-17 | End: 2018-07-17

## 2018-07-17 RX ADMIN — LIDOCAINE HYDROCHLORIDE 20 ML: 40 SOLUTION TOPICAL at 14:00

## 2018-07-17 NOTE — PROGRESS NOTES
I have noted, and reviewed today's data for this patient in [facility] and concur with same. The focused physical exam, other physical findings, Medical history, Review of Symptoms and Medications today remains unchanged except as noted below. Patient notes today: No new C/O. Long discussion with NH  regarding palliation versus aggressive care. She feels that Jamir BKA would be most helpful. Lesion/Wound, focused exam on Presentation today: many open wounds covered, with xeroform now less crust, several are closed. RLE lateral fibular wound with some necrotic slough proximally, LLE lateral calf with some slough but is much smaller. .    Procedure:     Wound # open wounds BLEs. Procedure name: sharp debridement and selective debridement. Anaesthesia: Lidocaine; topical    Description: using a sharp scissors I removed necrotic skin and fascia down to R fibula but not into bone, I use dry gauze to removed  Surface slough and exudate from superficial ulcers of both legs to effect a clean bleeding base. Post debridement dimensions changed as noted:    Depth, add 1.0 mm;    Width, add 0.0 mm   Length, add 0.0 mm. Blood Loss: 3 CCs  Post Procedure Condition/ Diagnosis: as above, some progress of smaller and fewer open wounds. .  Follow up and Future plans today: return 2 weeks, continue current dressings. Specimens: 0. Patient Counseled regarding/Discussed: as above, still need to discuss the concept of palliative care via POA with family as well as utility of BKAs. .  Clinical Considerations: as above, avoid trauma, dependant position, too frequent dressing change. Petty Harley

## 2018-07-17 NOTE — WOUND CARE
Visit Vitals  /85 (BP 1 Location: Right arm, BP Patient Position: Sitting)  Pulse 70  Temp 98.3 °F (36.8 °C)  Resp 16  
 
 
 07/17/18 1310 Wound Leg Left Date First Assessed/Time First Assessed: 06/12/18 1348   Wound Type: Vascular  Location: Leg  Orientation: Left DRESSING STATUS Clean, dry, and intact; Removed DRESSING TYPE ABD pad;Aquacel Wound Length (cm) 23 cm Wound Width (cm) 13 cm Wound Depth (cm) 0.2 Wound Surface area (cm^2) 299 cm^2 Drainage Amount  Moderate Drainage Color Serosanguinous Wound Odor None Periwound Skin Condition Erythema, blanchable Cleansing and Cleansing Agents  Dermal wound cleanser;Normal saline Wound Leg Right Date First Assessed/Time First Assessed: 06/12/18 1357   Wound Type: Vascular  Location: Leg  Orientation: Right DRESSING STATUS Clean, dry, and intact DRESSING TYPE ABD pad;Aquacel;Non-adherent Wound Length (cm) 34 cm Wound Width (cm) 25 cm Wound Depth (cm) 0.8 Wound Surface area (cm^2) 850 cm^2 Condition of Base [place];Slough Drainage Amount  Moderate Drainage Color Serosanguinous Wound Odor None Periwound Skin Condition Erythema, blanchable